# Patient Record
Sex: MALE | Race: OTHER | NOT HISPANIC OR LATINO | Employment: FULL TIME | URBAN - METROPOLITAN AREA
[De-identification: names, ages, dates, MRNs, and addresses within clinical notes are randomized per-mention and may not be internally consistent; named-entity substitution may affect disease eponyms.]

---

## 2017-06-07 ENCOUNTER — HOSPITAL ENCOUNTER (OUTPATIENT)
Dept: LAB | Facility: MEDICAL CENTER | Age: 52
End: 2017-06-07
Attending: EMERGENCY MEDICINE
Payer: COMMERCIAL

## 2017-06-07 LAB
25(OH)D3 SERPL-MCNC: 37 NG/ML (ref 30–100)
ALBUMIN SERPL BCP-MCNC: 4.4 G/DL (ref 3.2–4.9)
ALBUMIN/GLOB SERPL: 1.6 G/DL
ALP SERPL-CCNC: 45 U/L (ref 30–99)
ALT SERPL-CCNC: 29 U/L (ref 2–50)
ANION GAP SERPL CALC-SCNC: 7 MMOL/L (ref 0–11.9)
AST SERPL-CCNC: 20 U/L (ref 12–45)
BASOPHILS # BLD AUTO: 0.9 % (ref 0–1.8)
BASOPHILS # BLD: 0.05 K/UL (ref 0–0.12)
BILIRUB SERPL-MCNC: 1.2 MG/DL (ref 0.1–1.5)
BUN SERPL-MCNC: 13 MG/DL (ref 8–22)
CALCIUM SERPL-MCNC: 9.4 MG/DL (ref 8.5–10.5)
CHLORIDE SERPL-SCNC: 105 MMOL/L (ref 96–112)
CHOLEST SERPL-MCNC: 242 MG/DL (ref 100–199)
CO2 SERPL-SCNC: 27 MMOL/L (ref 20–33)
CREAT SERPL-MCNC: 0.85 MG/DL (ref 0.5–1.4)
EOSINOPHIL # BLD AUTO: 0.18 K/UL (ref 0–0.51)
EOSINOPHIL NFR BLD: 3.1 % (ref 0–6.9)
ERYTHROCYTE [DISTWIDTH] IN BLOOD BY AUTOMATED COUNT: 43.8 FL (ref 35.9–50)
ESTRADIOL SERPL-MCNC: <20 PG/ML
GFR SERPL CREATININE-BSD FRML MDRD: >60 ML/MIN/1.73 M 2
GGT SERPL-CCNC: 31 U/L (ref 7–51)
GLOBULIN SER CALC-MCNC: 2.7 G/DL (ref 1.9–3.5)
GLUCOSE SERPL-MCNC: 103 MG/DL (ref 65–99)
HCT VFR BLD AUTO: 51.1 % (ref 42–52)
HDLC SERPL-MCNC: 55 MG/DL
HGB BLD-MCNC: 17.6 G/DL (ref 14–18)
IMM GRANULOCYTES # BLD AUTO: 0.01 K/UL (ref 0–0.11)
IMM GRANULOCYTES NFR BLD AUTO: 0.2 % (ref 0–0.9)
LDH SERPL-CCNC: 162 U/L (ref 107–266)
LDLC SERPL CALC-MCNC: 154 MG/DL
LYMPHOCYTES # BLD AUTO: 2.39 K/UL (ref 1–4.8)
LYMPHOCYTES NFR BLD: 41.1 % (ref 22–41)
MCH RBC QN AUTO: 31.3 PG (ref 27–33)
MCHC RBC AUTO-ENTMCNC: 34.4 G/DL (ref 33.7–35.3)
MCV RBC AUTO: 90.8 FL (ref 81.4–97.8)
MONOCYTES # BLD AUTO: 0.5 K/UL (ref 0–0.85)
MONOCYTES NFR BLD AUTO: 8.6 % (ref 0–13.4)
NEUTROPHILS # BLD AUTO: 2.69 K/UL (ref 1.82–7.42)
NEUTROPHILS NFR BLD: 46.1 % (ref 44–72)
NRBC # BLD AUTO: 0 K/UL
NRBC BLD AUTO-RTO: 0 /100 WBC
PHOSPHATE SERPL-MCNC: 3.2 MG/DL (ref 2.5–4.5)
PLATELET # BLD AUTO: 215 K/UL (ref 164–446)
PMV BLD AUTO: 11 FL (ref 9–12.9)
POTASSIUM SERPL-SCNC: 4 MMOL/L (ref 3.6–5.5)
PROT SERPL-MCNC: 7.1 G/DL (ref 6–8.2)
PSA SERPL-MCNC: 0.98 NG/ML (ref 0–4)
RBC # BLD AUTO: 5.63 M/UL (ref 4.7–6.1)
SODIUM SERPL-SCNC: 139 MMOL/L (ref 135–145)
T3FREE SERPL-MCNC: 3.52 PG/ML (ref 2.4–4.2)
T4 FREE SERPL-MCNC: 0.76 NG/DL (ref 0.53–1.43)
TRIGL SERPL-MCNC: 163 MG/DL (ref 0–149)
TSH SERPL DL<=0.005 MIU/L-ACNC: 1.63 UIU/ML (ref 0.3–3.7)
URATE SERPL-MCNC: 8 MG/DL (ref 2.5–8.3)
WBC # BLD AUTO: 5.8 K/UL (ref 4.8–10.8)

## 2017-06-07 PROCEDURE — 80053 COMPREHEN METABOLIC PANEL: CPT

## 2017-06-07 PROCEDURE — 84481 FREE ASSAY (FT-3): CPT

## 2017-06-07 PROCEDURE — 82977 ASSAY OF GGT: CPT

## 2017-06-07 PROCEDURE — 84270 ASSAY OF SEX HORMONE GLOBUL: CPT

## 2017-06-07 PROCEDURE — 84305 ASSAY OF SOMATOMEDIN: CPT

## 2017-06-07 PROCEDURE — 84439 ASSAY OF FREE THYROXINE: CPT

## 2017-06-07 PROCEDURE — 82670 ASSAY OF TOTAL ESTRADIOL: CPT

## 2017-06-07 PROCEDURE — 84403 ASSAY OF TOTAL TESTOSTERONE: CPT

## 2017-06-07 PROCEDURE — 36415 COLL VENOUS BLD VENIPUNCTURE: CPT

## 2017-06-07 PROCEDURE — 84402 ASSAY OF FREE TESTOSTERONE: CPT

## 2017-06-07 PROCEDURE — 84550 ASSAY OF BLOOD/URIC ACID: CPT

## 2017-06-07 PROCEDURE — 80061 LIPID PANEL: CPT

## 2017-06-07 PROCEDURE — 85025 COMPLETE CBC W/AUTO DIFF WBC: CPT

## 2017-06-07 PROCEDURE — 84100 ASSAY OF PHOSPHORUS: CPT

## 2017-06-07 PROCEDURE — 82306 VITAMIN D 25 HYDROXY: CPT

## 2017-06-07 PROCEDURE — 84153 ASSAY OF PSA TOTAL: CPT

## 2017-06-07 PROCEDURE — 83615 LACTATE (LD) (LDH) ENZYME: CPT

## 2017-06-07 PROCEDURE — 84443 ASSAY THYROID STIM HORMONE: CPT

## 2017-06-09 LAB
IGF-I SERPL-MCNC: 151 NG/ML (ref 68–245)
SHBG SERPL-SCNC: 27 NMOL/L (ref 11–80)
TESTOST FREE MFR SERPL: 2 % (ref 1.6–2.9)
TESTOST FREE SERPL-MCNC: 52 PG/ML (ref 47–244)
TESTOST SERPL-MCNC: 262 NG/DL (ref 300–890)

## 2018-03-16 ENCOUNTER — HOSPITAL ENCOUNTER (OUTPATIENT)
Dept: LAB | Facility: MEDICAL CENTER | Age: 53
End: 2018-03-16
Attending: EMERGENCY MEDICINE
Payer: COMMERCIAL

## 2018-03-16 LAB
ALBUMIN SERPL BCP-MCNC: 4.8 G/DL (ref 3.2–4.9)
ALBUMIN/GLOB SERPL: 2.1 G/DL
ALP SERPL-CCNC: 46 U/L (ref 30–99)
ALT SERPL-CCNC: 30 U/L (ref 2–50)
ANION GAP SERPL CALC-SCNC: 10 MMOL/L (ref 0–11.9)
AST SERPL-CCNC: 20 U/L (ref 12–45)
BASOPHILS # BLD AUTO: 0.7 % (ref 0–1.8)
BASOPHILS # BLD: 0.04 K/UL (ref 0–0.12)
BILIRUB SERPL-MCNC: 0.9 MG/DL (ref 0.1–1.5)
BUN SERPL-MCNC: 12 MG/DL (ref 8–22)
CALCIUM SERPL-MCNC: 9.3 MG/DL (ref 8.5–10.5)
CHLORIDE SERPL-SCNC: 106 MMOL/L (ref 96–112)
CHOLEST SERPL-MCNC: 245 MG/DL (ref 100–199)
CO2 SERPL-SCNC: 24 MMOL/L (ref 20–33)
CREAT SERPL-MCNC: 0.93 MG/DL (ref 0.5–1.4)
EOSINOPHIL # BLD AUTO: 0.12 K/UL (ref 0–0.51)
EOSINOPHIL NFR BLD: 2.1 % (ref 0–6.9)
ERYTHROCYTE [DISTWIDTH] IN BLOOD BY AUTOMATED COUNT: 41.9 FL (ref 35.9–50)
ESTRADIOL SERPL-MCNC: 31 PG/ML
GGT SERPL-CCNC: 22 U/L (ref 7–51)
GLOBULIN SER CALC-MCNC: 2.3 G/DL (ref 1.9–3.5)
GLUCOSE SERPL-MCNC: 102 MG/DL (ref 65–99)
HCT VFR BLD AUTO: 52.1 % (ref 42–52)
HDLC SERPL-MCNC: 53 MG/DL
HGB BLD-MCNC: 18.1 G/DL (ref 14–18)
IMM GRANULOCYTES # BLD AUTO: 0.02 K/UL (ref 0–0.11)
IMM GRANULOCYTES NFR BLD AUTO: 0.3 % (ref 0–0.9)
LDH SERPL L TO P-CCNC: 159 U/L (ref 107–266)
LDLC SERPL CALC-MCNC: 159 MG/DL
LYMPHOCYTES # BLD AUTO: 2.08 K/UL (ref 1–4.8)
LYMPHOCYTES NFR BLD: 35.6 % (ref 22–41)
MCH RBC QN AUTO: 31.5 PG (ref 27–33)
MCHC RBC AUTO-ENTMCNC: 34.7 G/DL (ref 33.7–35.3)
MCV RBC AUTO: 90.8 FL (ref 81.4–97.8)
MONOCYTES # BLD AUTO: 0.42 K/UL (ref 0–0.85)
MONOCYTES NFR BLD AUTO: 7.2 % (ref 0–13.4)
NEUTROPHILS # BLD AUTO: 3.16 K/UL (ref 1.82–7.42)
NEUTROPHILS NFR BLD: 54.1 % (ref 44–72)
NRBC # BLD AUTO: 0 K/UL
NRBC BLD-RTO: 0 /100 WBC
PHOSPHATE SERPL-MCNC: 2.7 MG/DL (ref 2.5–4.5)
PLATELET # BLD AUTO: 246 K/UL (ref 164–446)
PMV BLD AUTO: 10.8 FL (ref 9–12.9)
POTASSIUM SERPL-SCNC: 4 MMOL/L (ref 3.6–5.5)
PROT SERPL-MCNC: 7.1 G/DL (ref 6–8.2)
PSA SERPL-MCNC: 0.72 NG/ML (ref 0–4)
RBC # BLD AUTO: 5.74 M/UL (ref 4.7–6.1)
SODIUM SERPL-SCNC: 140 MMOL/L (ref 135–145)
TRIGL SERPL-MCNC: 163 MG/DL (ref 0–149)
URATE SERPL-MCNC: 5.6 MG/DL (ref 2.5–8.3)
WBC # BLD AUTO: 5.8 K/UL (ref 4.8–10.8)

## 2018-03-16 PROCEDURE — 84402 ASSAY OF FREE TESTOSTERONE: CPT

## 2018-03-16 PROCEDURE — 82977 ASSAY OF GGT: CPT

## 2018-03-16 PROCEDURE — 82670 ASSAY OF TOTAL ESTRADIOL: CPT

## 2018-03-16 PROCEDURE — 84305 ASSAY OF SOMATOMEDIN: CPT

## 2018-03-16 PROCEDURE — 85025 COMPLETE CBC W/AUTO DIFF WBC: CPT

## 2018-03-16 PROCEDURE — 80053 COMPREHEN METABOLIC PANEL: CPT

## 2018-03-16 PROCEDURE — 84550 ASSAY OF BLOOD/URIC ACID: CPT

## 2018-03-16 PROCEDURE — 84403 ASSAY OF TOTAL TESTOSTERONE: CPT

## 2018-03-16 PROCEDURE — 84270 ASSAY OF SEX HORMONE GLOBUL: CPT

## 2018-03-16 PROCEDURE — 84153 ASSAY OF PSA TOTAL: CPT

## 2018-03-16 PROCEDURE — 84100 ASSAY OF PHOSPHORUS: CPT

## 2018-03-16 PROCEDURE — 36415 COLL VENOUS BLD VENIPUNCTURE: CPT

## 2018-03-16 PROCEDURE — 80061 LIPID PANEL: CPT

## 2018-03-16 PROCEDURE — 83615 LACTATE (LD) (LDH) ENZYME: CPT

## 2018-03-18 LAB
IGF-I SERPL-MCNC: 173 NG/ML (ref 64–218)
IGF-I Z-SCORE SERPL: 1
SHBG SERPL-SCNC: 28 NMOL/L (ref 11–80)
TESTOST FREE MFR SERPL: 2 % (ref 1.6–2.9)
TESTOST FREE SERPL-MCNC: 76 PG/ML (ref 47–244)
TESTOST SERPL-MCNC: 381 NG/DL (ref 300–890)

## 2019-01-10 ENCOUNTER — HOSPITAL ENCOUNTER (OUTPATIENT)
Dept: LAB | Facility: MEDICAL CENTER | Age: 54
End: 2019-01-10
Attending: EMERGENCY MEDICINE
Payer: COMMERCIAL

## 2019-01-10 LAB
ALBUMIN SERPL BCP-MCNC: 4.3 G/DL (ref 3.2–4.9)
ALBUMIN/GLOB SERPL: 1.7 G/DL
ALP SERPL-CCNC: 39 U/L (ref 30–99)
ALT SERPL-CCNC: 24 U/L (ref 2–50)
ANION GAP SERPL CALC-SCNC: 8 MMOL/L (ref 0–11.9)
AST SERPL-CCNC: 18 U/L (ref 12–45)
BASOPHILS # BLD AUTO: 0.9 % (ref 0–1.8)
BASOPHILS # BLD: 0.06 K/UL (ref 0–0.12)
BILIRUB SERPL-MCNC: 0.8 MG/DL (ref 0.1–1.5)
BUN SERPL-MCNC: 11 MG/DL (ref 8–22)
CALCIUM SERPL-MCNC: 9.3 MG/DL (ref 8.5–10.5)
CHLORIDE SERPL-SCNC: 108 MMOL/L (ref 96–112)
CHOLEST SERPL-MCNC: 215 MG/DL (ref 100–199)
CO2 SERPL-SCNC: 28 MMOL/L (ref 20–33)
CREAT SERPL-MCNC: 0.89 MG/DL (ref 0.5–1.4)
EOSINOPHIL # BLD AUTO: 0.13 K/UL (ref 0–0.51)
EOSINOPHIL NFR BLD: 2 % (ref 0–6.9)
ERYTHROCYTE [DISTWIDTH] IN BLOOD BY AUTOMATED COUNT: 42.1 FL (ref 35.9–50)
ESTRADIOL SERPL-MCNC: <20 PG/ML
GGT SERPL-CCNC: 27 U/L (ref 7–51)
GLOBULIN SER CALC-MCNC: 2.5 G/DL (ref 1.9–3.5)
GLUCOSE SERPL-MCNC: 100 MG/DL (ref 65–99)
HCT VFR BLD AUTO: 46.2 % (ref 42–52)
HDLC SERPL-MCNC: 51 MG/DL
HGB BLD-MCNC: 16.2 G/DL (ref 14–18)
IMM GRANULOCYTES # BLD AUTO: 0.01 K/UL (ref 0–0.11)
IMM GRANULOCYTES NFR BLD AUTO: 0.2 % (ref 0–0.9)
LDH SERPL L TO P-CCNC: 149 U/L (ref 107–266)
LDLC SERPL CALC-MCNC: 136 MG/DL
LYMPHOCYTES # BLD AUTO: 2.98 K/UL (ref 1–4.8)
LYMPHOCYTES NFR BLD: 45.7 % (ref 22–41)
MCH RBC QN AUTO: 31.5 PG (ref 27–33)
MCHC RBC AUTO-ENTMCNC: 35.1 G/DL (ref 33.7–35.3)
MCV RBC AUTO: 89.7 FL (ref 81.4–97.8)
MONOCYTES # BLD AUTO: 0.51 K/UL (ref 0–0.85)
MONOCYTES NFR BLD AUTO: 7.8 % (ref 0–13.4)
NEUTROPHILS # BLD AUTO: 2.83 K/UL (ref 1.82–7.42)
NEUTROPHILS NFR BLD: 43.4 % (ref 44–72)
NRBC # BLD AUTO: 0 K/UL
NRBC BLD-RTO: 0 /100 WBC
PHOSPHATE SERPL-MCNC: 3.2 MG/DL (ref 2.5–4.5)
PLATELET # BLD AUTO: 224 K/UL (ref 164–446)
PMV BLD AUTO: 11.4 FL (ref 9–12.9)
POTASSIUM SERPL-SCNC: 4.5 MMOL/L (ref 3.6–5.5)
PROT SERPL-MCNC: 6.8 G/DL (ref 6–8.2)
PSA SERPL-MCNC: 0.95 NG/ML (ref 0–4)
RBC # BLD AUTO: 5.15 M/UL (ref 4.7–6.1)
SODIUM SERPL-SCNC: 144 MMOL/L (ref 135–145)
TRIGL SERPL-MCNC: 141 MG/DL (ref 0–149)
URATE SERPL-MCNC: 6.5 MG/DL (ref 2.5–8.3)
WBC # BLD AUTO: 6.5 K/UL (ref 4.8–10.8)

## 2019-01-10 PROCEDURE — 84403 ASSAY OF TOTAL TESTOSTERONE: CPT

## 2019-01-10 PROCEDURE — 84153 ASSAY OF PSA TOTAL: CPT

## 2019-01-10 PROCEDURE — 84305 ASSAY OF SOMATOMEDIN: CPT

## 2019-01-10 PROCEDURE — 82670 ASSAY OF TOTAL ESTRADIOL: CPT

## 2019-01-10 PROCEDURE — 83615 LACTATE (LD) (LDH) ENZYME: CPT

## 2019-01-10 PROCEDURE — 82977 ASSAY OF GGT: CPT

## 2019-01-10 PROCEDURE — 84270 ASSAY OF SEX HORMONE GLOBUL: CPT

## 2019-01-10 PROCEDURE — 80053 COMPREHEN METABOLIC PANEL: CPT

## 2019-01-10 PROCEDURE — 80061 LIPID PANEL: CPT

## 2019-01-10 PROCEDURE — 84100 ASSAY OF PHOSPHORUS: CPT

## 2019-01-10 PROCEDURE — 84550 ASSAY OF BLOOD/URIC ACID: CPT

## 2019-01-10 PROCEDURE — 36415 COLL VENOUS BLD VENIPUNCTURE: CPT

## 2019-01-10 PROCEDURE — 85025 COMPLETE CBC W/AUTO DIFF WBC: CPT

## 2019-01-12 LAB
SHBG SERPL-SCNC: 28 NMOL/L (ref 11–80)
TESTOST FREE MFR SERPL: 2.1 % (ref 1.6–2.9)
TESTOST FREE SERPL-MCNC: 125 PG/ML (ref 47–244)
TESTOST SERPL-MCNC: 592 NG/DL (ref 300–890)

## 2019-01-13 LAB
IGF-I SERPL-MCNC: 181 NG/ML (ref 62–214)
IGF-I Z-SCORE SERPL: 1.2

## 2019-08-12 ENCOUNTER — HOSPITAL ENCOUNTER (OUTPATIENT)
Dept: LAB | Facility: MEDICAL CENTER | Age: 54
End: 2019-08-12
Attending: EMERGENCY MEDICINE
Payer: COMMERCIAL

## 2019-08-12 LAB
ALBUMIN SERPL BCP-MCNC: 4.5 G/DL (ref 3.2–4.9)
ALBUMIN/GLOB SERPL: 1.6 G/DL
ALP SERPL-CCNC: 47 U/L (ref 30–99)
ALT SERPL-CCNC: 30 U/L (ref 2–50)
ANION GAP SERPL CALC-SCNC: 11 MMOL/L (ref 0–11.9)
AST SERPL-CCNC: 22 U/L (ref 12–45)
BASOPHILS # BLD AUTO: 0.8 % (ref 0–1.8)
BASOPHILS # BLD: 0.04 K/UL (ref 0–0.12)
BILIRUB SERPL-MCNC: 0.9 MG/DL (ref 0.1–1.5)
BUN SERPL-MCNC: 14 MG/DL (ref 8–22)
CALCIUM SERPL-MCNC: 9 MG/DL (ref 8.5–10.5)
CHLORIDE SERPL-SCNC: 104 MMOL/L (ref 96–112)
CHOLEST SERPL-MCNC: 254 MG/DL (ref 100–199)
CO2 SERPL-SCNC: 23 MMOL/L (ref 20–33)
CREAT SERPL-MCNC: 0.98 MG/DL (ref 0.5–1.4)
EOSINOPHIL # BLD AUTO: 0.1 K/UL (ref 0–0.51)
EOSINOPHIL NFR BLD: 2.1 % (ref 0–6.9)
ERYTHROCYTE [DISTWIDTH] IN BLOOD BY AUTOMATED COUNT: 40.9 FL (ref 35.9–50)
ESTRADIOL SERPL-MCNC: <20 PG/ML
GGT SERPL-CCNC: 25 U/L (ref 7–51)
GLOBULIN SER CALC-MCNC: 2.9 G/DL (ref 1.9–3.5)
GLUCOSE SERPL-MCNC: 91 MG/DL (ref 65–99)
HCT VFR BLD AUTO: 46 % (ref 42–52)
HDLC SERPL-MCNC: 61 MG/DL
HGB BLD-MCNC: 15.3 G/DL (ref 14–18)
IMM GRANULOCYTES # BLD AUTO: 0 K/UL (ref 0–0.11)
IMM GRANULOCYTES NFR BLD AUTO: 0 % (ref 0–0.9)
LDH SERPL L TO P-CCNC: 178 U/L (ref 107–266)
LDLC SERPL CALC-MCNC: 165 MG/DL
LYMPHOCYTES # BLD AUTO: 2.08 K/UL (ref 1–4.8)
LYMPHOCYTES NFR BLD: 43.1 % (ref 22–41)
MCH RBC QN AUTO: 27.5 PG (ref 27–33)
MCHC RBC AUTO-ENTMCNC: 33.3 G/DL (ref 33.7–35.3)
MCV RBC AUTO: 82.7 FL (ref 81.4–97.8)
MONOCYTES # BLD AUTO: 0.46 K/UL (ref 0–0.85)
MONOCYTES NFR BLD AUTO: 9.5 % (ref 0–13.4)
NEUTROPHILS # BLD AUTO: 2.15 K/UL (ref 1.82–7.42)
NEUTROPHILS NFR BLD: 44.5 % (ref 44–72)
NRBC # BLD AUTO: 0 K/UL
NRBC BLD-RTO: 0 /100 WBC
PHOSPHATE SERPL-MCNC: 2.9 MG/DL (ref 2.5–4.5)
PLATELET # BLD AUTO: 232 K/UL (ref 164–446)
PMV BLD AUTO: 11.2 FL (ref 9–12.9)
POTASSIUM SERPL-SCNC: 3.7 MMOL/L (ref 3.6–5.5)
PROT SERPL-MCNC: 7.4 G/DL (ref 6–8.2)
PSA SERPL-MCNC: 0.59 NG/ML (ref 0–4)
RBC # BLD AUTO: 5.56 M/UL (ref 4.7–6.1)
SODIUM SERPL-SCNC: 138 MMOL/L (ref 135–145)
T3FREE SERPL-MCNC: 3.21 PG/ML (ref 2.4–4.2)
T4 SERPL-MCNC: 5.4 UG/DL (ref 4–12)
TRIGL SERPL-MCNC: 142 MG/DL (ref 0–149)
TSH SERPL DL<=0.005 MIU/L-ACNC: 4.18 UIU/ML (ref 0.38–5.33)
URATE SERPL-MCNC: 7.6 MG/DL (ref 2.5–8.3)
WBC # BLD AUTO: 4.8 K/UL (ref 4.8–10.8)

## 2019-08-12 PROCEDURE — 36415 COLL VENOUS BLD VENIPUNCTURE: CPT

## 2019-08-12 PROCEDURE — 80061 LIPID PANEL: CPT

## 2019-08-12 PROCEDURE — 84305 ASSAY OF SOMATOMEDIN: CPT

## 2019-08-12 PROCEDURE — 84443 ASSAY THYROID STIM HORMONE: CPT

## 2019-08-12 PROCEDURE — 80053 COMPREHEN METABOLIC PANEL: CPT

## 2019-08-12 PROCEDURE — 84481 FREE ASSAY (FT-3): CPT

## 2019-08-12 PROCEDURE — 84100 ASSAY OF PHOSPHORUS: CPT

## 2019-08-12 PROCEDURE — 83615 LACTATE (LD) (LDH) ENZYME: CPT

## 2019-08-12 PROCEDURE — 84436 ASSAY OF TOTAL THYROXINE: CPT

## 2019-08-12 PROCEDURE — 84153 ASSAY OF PSA TOTAL: CPT

## 2019-08-12 PROCEDURE — 85025 COMPLETE CBC W/AUTO DIFF WBC: CPT

## 2019-08-12 PROCEDURE — 82977 ASSAY OF GGT: CPT

## 2019-08-12 PROCEDURE — 82670 ASSAY OF TOTAL ESTRADIOL: CPT

## 2019-08-12 PROCEDURE — 84550 ASSAY OF BLOOD/URIC ACID: CPT

## 2019-08-12 PROCEDURE — 84270 ASSAY OF SEX HORMONE GLOBUL: CPT

## 2019-08-12 PROCEDURE — 84403 ASSAY OF TOTAL TESTOSTERONE: CPT

## 2019-08-14 LAB
IGF-I SERPL-MCNC: 123 NG/ML (ref 62–214)
IGF-I Z-SCORE SERPL: 0
SHBG SERPL-SCNC: 29 NMOL/L (ref 11–80)
TESTOST FREE MFR SERPL: 1.8 % (ref 1.6–2.9)
TESTOST FREE SERPL-MCNC: 15 PG/ML (ref 47–244)
TESTOST SERPL-MCNC: 84 NG/DL (ref 300–890)

## 2021-08-12 ENCOUNTER — PRE-ADMISSION TESTING (OUTPATIENT)
Dept: ADMISSIONS | Facility: MEDICAL CENTER | Age: 56
End: 2021-08-12
Attending: OPHTHALMOLOGY
Payer: COMMERCIAL

## 2021-08-12 RX ORDER — AMPICILLIN TRIHYDRATE 250 MG
1 CAPSULE ORAL DAILY
COMMUNITY

## 2021-08-18 ENCOUNTER — HOSPITAL ENCOUNTER (OUTPATIENT)
Facility: MEDICAL CENTER | Age: 56
End: 2021-08-18
Attending: OPHTHALMOLOGY | Admitting: OPHTHALMOLOGY
Payer: COMMERCIAL

## 2021-08-18 ENCOUNTER — ANESTHESIA EVENT (OUTPATIENT)
Dept: SURGERY | Facility: MEDICAL CENTER | Age: 56
End: 2021-08-18
Payer: COMMERCIAL

## 2021-08-18 ENCOUNTER — ANESTHESIA (OUTPATIENT)
Dept: SURGERY | Facility: MEDICAL CENTER | Age: 56
End: 2021-08-18
Payer: COMMERCIAL

## 2021-08-18 VITALS
OXYGEN SATURATION: 96 % | HEART RATE: 90 BPM | WEIGHT: 187.39 LBS | BODY MASS INDEX: 29.41 KG/M2 | HEIGHT: 67 IN | RESPIRATION RATE: 18 BRPM | DIASTOLIC BLOOD PRESSURE: 84 MMHG | TEMPERATURE: 97 F | SYSTOLIC BLOOD PRESSURE: 146 MMHG

## 2021-08-18 PROCEDURE — 700101 HCHG RX REV CODE 250: Performed by: OPHTHALMOLOGY

## 2021-08-18 PROCEDURE — 160027 HCHG SURGERY MINUTES - 1ST 30 MINS LEVEL 2: Performed by: OPHTHALMOLOGY

## 2021-08-18 PROCEDURE — 160048 HCHG OR STATISTICAL LEVEL 1-5: Performed by: OPHTHALMOLOGY

## 2021-08-18 PROCEDURE — 160038 HCHG SURGERY MINUTES - EA ADDL 1 MIN LEVEL 2: Performed by: OPHTHALMOLOGY

## 2021-08-18 PROCEDURE — 500558 HCHG EYE SHIELD W/GARTER (FOX): Performed by: OPHTHALMOLOGY

## 2021-08-18 PROCEDURE — 700102 HCHG RX REV CODE 250 W/ 637 OVERRIDE(OP): Performed by: ANESTHESIOLOGY

## 2021-08-18 PROCEDURE — 501838 HCHG SUTURE GENERAL: Performed by: OPHTHALMOLOGY

## 2021-08-18 PROCEDURE — 160002 HCHG RECOVERY MINUTES (STAT): Performed by: OPHTHALMOLOGY

## 2021-08-18 PROCEDURE — 160046 HCHG PACU - 1ST 60 MINS PHASE II: Performed by: OPHTHALMOLOGY

## 2021-08-18 PROCEDURE — 160009 HCHG ANES TIME/MIN: Performed by: OPHTHALMOLOGY

## 2021-08-18 PROCEDURE — 700111 HCHG RX REV CODE 636 W/ 250 OVERRIDE (IP): Performed by: ANESTHESIOLOGY

## 2021-08-18 PROCEDURE — 160025 RECOVERY II MINUTES (STATS): Performed by: OPHTHALMOLOGY

## 2021-08-18 PROCEDURE — 700101 HCHG RX REV CODE 250: Performed by: ANESTHESIOLOGY

## 2021-08-18 PROCEDURE — 160035 HCHG PACU - 1ST 60 MINS PHASE I: Performed by: OPHTHALMOLOGY

## 2021-08-18 PROCEDURE — A9270 NON-COVERED ITEM OR SERVICE: HCPCS | Performed by: ANESTHESIOLOGY

## 2021-08-18 RX ORDER — HYDRALAZINE HYDROCHLORIDE 20 MG/ML
5 INJECTION INTRAMUSCULAR; INTRAVENOUS
Status: DISCONTINUED | OUTPATIENT
Start: 2021-08-18 | End: 2021-08-18 | Stop reason: HOSPADM

## 2021-08-18 RX ORDER — HALOPERIDOL 5 MG/ML
1 INJECTION INTRAMUSCULAR
Status: DISCONTINUED | OUTPATIENT
Start: 2021-08-18 | End: 2021-08-18 | Stop reason: HOSPADM

## 2021-08-18 RX ORDER — ONDANSETRON 2 MG/ML
INJECTION INTRAMUSCULAR; INTRAVENOUS PRN
Status: DISCONTINUED | OUTPATIENT
Start: 2021-08-18 | End: 2021-08-18 | Stop reason: SURG

## 2021-08-18 RX ORDER — LABETALOL HYDROCHLORIDE 5 MG/ML
5 INJECTION, SOLUTION INTRAVENOUS
Status: DISCONTINUED | OUTPATIENT
Start: 2021-08-18 | End: 2021-08-18 | Stop reason: HOSPADM

## 2021-08-18 RX ORDER — MEPERIDINE HYDROCHLORIDE 25 MG/ML
12.5 INJECTION INTRAMUSCULAR; INTRAVENOUS; SUBCUTANEOUS
Status: DISCONTINUED | OUTPATIENT
Start: 2021-08-18 | End: 2021-08-18 | Stop reason: HOSPADM

## 2021-08-18 RX ORDER — CEFAZOLIN SODIUM 1 G/3ML
INJECTION, POWDER, FOR SOLUTION INTRAMUSCULAR; INTRAVENOUS PRN
Status: DISCONTINUED | OUTPATIENT
Start: 2021-08-18 | End: 2021-08-18 | Stop reason: SURG

## 2021-08-18 RX ORDER — DIPHENHYDRAMINE HYDROCHLORIDE 50 MG/ML
12.5 INJECTION INTRAMUSCULAR; INTRAVENOUS
Status: DISCONTINUED | OUTPATIENT
Start: 2021-08-18 | End: 2021-08-18 | Stop reason: HOSPADM

## 2021-08-18 RX ORDER — ROCURONIUM BROMIDE 10 MG/ML
INJECTION, SOLUTION INTRAVENOUS PRN
Status: DISCONTINUED | OUTPATIENT
Start: 2021-08-18 | End: 2021-08-18 | Stop reason: SURG

## 2021-08-18 RX ORDER — ACETAMINOPHEN 500 MG
1000 TABLET ORAL ONCE
Status: COMPLETED | OUTPATIENT
Start: 2021-08-18 | End: 2021-08-18

## 2021-08-18 RX ORDER — ERYTHROMYCIN 5 MG/G
OINTMENT OPHTHALMIC
Status: DISCONTINUED
Start: 2021-08-18 | End: 2021-08-18 | Stop reason: HOSPADM

## 2021-08-18 RX ORDER — SODIUM CHLORIDE, SODIUM LACTATE, POTASSIUM CHLORIDE, CALCIUM CHLORIDE 600; 310; 30; 20 MG/100ML; MG/100ML; MG/100ML; MG/100ML
INJECTION, SOLUTION INTRAVENOUS CONTINUOUS
Status: DISCONTINUED | OUTPATIENT
Start: 2021-08-18 | End: 2021-08-18 | Stop reason: HOSPADM

## 2021-08-18 RX ORDER — ERYTHROMYCIN 5 MG/G
OINTMENT OPHTHALMIC
Status: DISCONTINUED | OUTPATIENT
Start: 2021-08-18 | End: 2021-08-18 | Stop reason: HOSPADM

## 2021-08-18 RX ORDER — DEXAMETHASONE SODIUM PHOSPHATE 4 MG/ML
INJECTION, SOLUTION INTRA-ARTICULAR; INTRALESIONAL; INTRAMUSCULAR; INTRAVENOUS; SOFT TISSUE PRN
Status: DISCONTINUED | OUTPATIENT
Start: 2021-08-18 | End: 2021-08-18 | Stop reason: SURG

## 2021-08-18 RX ORDER — HYDROMORPHONE HYDROCHLORIDE 1 MG/ML
0.4 INJECTION, SOLUTION INTRAMUSCULAR; INTRAVENOUS; SUBCUTANEOUS
Status: DISCONTINUED | OUTPATIENT
Start: 2021-08-18 | End: 2021-08-18 | Stop reason: HOSPADM

## 2021-08-18 RX ORDER — OXYCODONE HCL 5 MG/5 ML
5 SOLUTION, ORAL ORAL
Status: DISCONTINUED | OUTPATIENT
Start: 2021-08-18 | End: 2021-08-18 | Stop reason: HOSPADM

## 2021-08-18 RX ORDER — MIDAZOLAM HYDROCHLORIDE 1 MG/ML
1 INJECTION INTRAMUSCULAR; INTRAVENOUS
Status: DISCONTINUED | OUTPATIENT
Start: 2021-08-18 | End: 2021-08-18 | Stop reason: HOSPADM

## 2021-08-18 RX ORDER — OXYCODONE HCL 5 MG/5 ML
10 SOLUTION, ORAL ORAL
Status: DISCONTINUED | OUTPATIENT
Start: 2021-08-18 | End: 2021-08-18 | Stop reason: HOSPADM

## 2021-08-18 RX ORDER — LIDOCAINE HYDROCHLORIDE 20 MG/ML
INJECTION, SOLUTION EPIDURAL; INFILTRATION; INTRACAUDAL; PERINEURAL PRN
Status: DISCONTINUED | OUTPATIENT
Start: 2021-08-18 | End: 2021-08-18 | Stop reason: SURG

## 2021-08-18 RX ORDER — HYDROMORPHONE HYDROCHLORIDE 1 MG/ML
0.2 INJECTION, SOLUTION INTRAMUSCULAR; INTRAVENOUS; SUBCUTANEOUS
Status: DISCONTINUED | OUTPATIENT
Start: 2021-08-18 | End: 2021-08-18 | Stop reason: HOSPADM

## 2021-08-18 RX ORDER — ONDANSETRON 2 MG/ML
4 INJECTION INTRAMUSCULAR; INTRAVENOUS
Status: DISCONTINUED | OUTPATIENT
Start: 2021-08-18 | End: 2021-08-18 | Stop reason: HOSPADM

## 2021-08-18 RX ORDER — LIDOCAINE HYDROCHLORIDE 20 MG/ML
INJECTION, SOLUTION INFILTRATION; PERINEURAL
Status: COMPLETED
Start: 2021-08-18 | End: 2021-08-18

## 2021-08-18 RX ORDER — LIDOCAINE HYDROCHLORIDE AND EPINEPHRINE BITARTRATE 20; .01 MG/ML; MG/ML
INJECTION, SOLUTION SUBCUTANEOUS
Status: DISCONTINUED | OUTPATIENT
Start: 2021-08-18 | End: 2021-08-18 | Stop reason: HOSPADM

## 2021-08-18 RX ORDER — MIDAZOLAM HYDROCHLORIDE 1 MG/ML
INJECTION INTRAMUSCULAR; INTRAVENOUS PRN
Status: DISCONTINUED | OUTPATIENT
Start: 2021-08-18 | End: 2021-08-18 | Stop reason: SURG

## 2021-08-18 RX ORDER — HYDROMORPHONE HYDROCHLORIDE 1 MG/ML
0.1 INJECTION, SOLUTION INTRAMUSCULAR; INTRAVENOUS; SUBCUTANEOUS
Status: DISCONTINUED | OUTPATIENT
Start: 2021-08-18 | End: 2021-08-18 | Stop reason: HOSPADM

## 2021-08-18 RX ORDER — METOPROLOL TARTRATE 1 MG/ML
1 INJECTION, SOLUTION INTRAVENOUS
Status: DISCONTINUED | OUTPATIENT
Start: 2021-08-18 | End: 2021-08-18 | Stop reason: HOSPADM

## 2021-08-18 RX ADMIN — ONDANSETRON 4 MG: 2 INJECTION INTRAMUSCULAR; INTRAVENOUS at 15:56

## 2021-08-18 RX ADMIN — LIDOCAINE HYDROCHLORIDE 100 MG: 20 INJECTION, SOLUTION EPIDURAL; INFILTRATION; INTRACAUDAL at 15:50

## 2021-08-18 RX ADMIN — FENTANYL CITRATE 50 MCG: 50 INJECTION, SOLUTION INTRAMUSCULAR; INTRAVENOUS at 16:52

## 2021-08-18 RX ADMIN — DEXAMETHASONE SODIUM PHOSPHATE 4 MG: 4 INJECTION, SOLUTION INTRA-ARTICULAR; INTRALESIONAL; INTRAMUSCULAR; INTRAVENOUS; SOFT TISSUE at 15:56

## 2021-08-18 RX ADMIN — PROPOFOL 200 MG: 10 INJECTION, EMULSION INTRAVENOUS at 15:50

## 2021-08-18 RX ADMIN — FENTANYL CITRATE 100 MCG: 50 INJECTION, SOLUTION INTRAMUSCULAR; INTRAVENOUS at 16:08

## 2021-08-18 RX ADMIN — ROCURONIUM BROMIDE 25 MG: 10 INJECTION, SOLUTION INTRAVENOUS at 15:50

## 2021-08-18 RX ADMIN — ACETAMINOPHEN 1000 MG: 500 TABLET ORAL at 14:24

## 2021-08-18 RX ADMIN — FENTANYL CITRATE 100 MCG: 50 INJECTION, SOLUTION INTRAMUSCULAR; INTRAVENOUS at 15:50

## 2021-08-18 RX ADMIN — CEFAZOLIN 2 G: 330 INJECTION, POWDER, FOR SOLUTION INTRAMUSCULAR; INTRAVENOUS at 15:50

## 2021-08-18 RX ADMIN — MIDAZOLAM HYDROCHLORIDE 2 MG: 1 INJECTION, SOLUTION INTRAMUSCULAR; INTRAVENOUS at 15:47

## 2021-08-18 ASSESSMENT — PAIN DESCRIPTION - PAIN TYPE
TYPE: SURGICAL PAIN

## 2021-08-18 ASSESSMENT — PAIN SCALES - GENERAL: PAIN_LEVEL: 0

## 2021-08-18 NOTE — OR NURSING
1700- Pt to PACU bay 3 from OR s/p bilateral upper lid blepharoplasty. Report from anesthesia and OR RN. Respirations even and unlabored. VSS.     1705- Oral airway d/c'd    1720- Pt awake. Declines pain. Tolerating sips of water.     1740- Pt's friend, Zahra, called and updated.    1745- Phase II criteria met    1755- Discharge instructions discussed with pt's friend, Zahra. All questions answered. Verbalized understanding.    1805- Pt expresses readiness for discharge. Helped to edge of bed. Dressing independently.    1815- PIV removed with tip intact. Pt escorted out of department ambulatory with all belongings. Discharged home to responsible adult.

## 2021-08-18 NOTE — ANESTHESIA PROCEDURE NOTES
Airway    Date/Time: 8/18/2021 3:52 PM  Performed by: Skinny Ledesma D.O.  Authorized by: Skinny Ledesma D.O.     Location:  OR  Urgency:  Elective  Indications for Airway Management:  Anesthesia      Spontaneous Ventilation: absent    Sedation Level:  Deep  Preoxygenated: Yes    Final Airway Type:  Supraglottic airway  Final Supraglottic Airway:  Standard LMA    SGA Size:  5  Number of Attempts at Approach:  1

## 2021-08-19 NOTE — OP REPORT
DATE OF OPERATION: 8/18/2021    ATTENDING SURGEON: Gregory Whaley MD     ASSISTANT SURGEON: None.     ANESTHESIOLOGIST: Skinny Moseley MD    ANESTHESIA: General.     PREOPERATIVE DIAGNOSES:   1. Visual obstruction, bilateral.   2. Dermatochalasis, bilateral.     POSTOPERATIVE DIAGNOSES:   1. Visual obstruction, bilateral.   2. Dermatochalasis, bilateral.     PROCEDURES PERFORMED:   1. Blepharoplasty, functional, bilateral upper eyelids.     SPECIMENS: None.     IMPLANTS: None.     COMPLICATIONS: None.     ESTIMATED BLOOD LOSS: Less than 5 mL.     INDICATIONS FOR PROCEDURE: This is a 55 y.o. male with a history of   dermatochalasis of the upper eyelids bilaterally. They were causing visual   obstruction that was demonstrable on clinical examination and on visual field   testing. It was recommended to the patient to undergo the above listed   procedures. Risks, benefits, and alternatives were explained to the patient   in detail and informed consent was signed.     PROCEDURE IN DETAIL: Patient was brought to the operating room and laid   supine on operating room table. The full face was then prepped and draped in   usual sterile fashion. A lid crease incision was then designed using a   marking pen taking into account the patient's natural lid crease at 10 mm   bilaterally. The superior extent of the blepharoplasty excision area was then   marked out with a caliper from the inferior brow hairs measuring 8 mm. This   was then verified with smooth forceps and then the excision area was then   drawn in completely with a marking pen. The procedure was then begun on   the right side first.     The right upper eyelid was then infiltrated with 2% lidocaine with   epinephrine. A #15 blade was used to incise the skin down to the orbicularis fibers along the excision areas marked out previously. Bipolar cautery was used for   hemostasis. A monopolar cautery was then used to excise a skin flap within the ellipse marked. Additional  hemostasis was obtained with bipolar cautery. The medial fat pad was exposed with sharp dissection through the orbital septum with additional exposure using a rake retractor.  The medial fat pad was teased out with a combination of sharp and careful blunt dissection.  Bipolar cautery was used for hemostasis as needed.  The fat pad was infiltrated with local anesthetic and then excised using a clamp-cut-cautery method.  The blepharoplasty was then closed with several interrupted lid crease reforming 6-0 express gut sutures. The wound was then closed with a 6-0 express gut suture.     Attention was then turned to the contralateral left upper eyelid where the identical   procedure was performed. Patient tolerated the procedure well. Antibiotic   ointment was placed on the incision at the conclusion of the case. There were   no complications. The patient was taken to the PACU in stable condition.

## 2021-08-19 NOTE — ANESTHESIA POSTPROCEDURE EVALUATION
Patient: Skinny Alcantar    Procedure Summary     Date: 08/18/21 Room / Location: [facility] ROOM 24 / SURGERY SAME DAY [facility]    Anesthesia Start: 1547 Anesthesia Stop: 1702    Procedure: BLEPHAROPLASTY - UPPER LID. (Bilateral Eye) Diagnosis: (VISUAL FIELD OBSTRUCTION, DERMATOCHALASIS)    Surgeons: Gregory Whaley M.D. Responsible Provider: Skinny Ledesma D.O.    Anesthesia Type: general ASA Status: 2          Final Anesthesia Type: general  Last vitals  BP   Blood Pressure: 104/55    Temp   36.8 °C (98.3 °F)    Pulse   78   Resp   14    SpO2   92 %      Anesthesia Post Evaluation    Patient location during evaluation: PACU  Patient participation: waiting for patient participation  Level of consciousness: awake and alert  Pain score: 0    Airway patency: patent  Anesthetic complications: no  Cardiovascular status: hemodynamically stable  Respiratory status: acceptable  Hydration status: euvolemic    PONV: none          There were no known complications for this encounter.

## 2021-08-19 NOTE — OR SURGEON
Immediate Post OP Note    PreOp Diagnosis: Dermatochalasis BUL      PostOp Diagnosis: Same      Procedure(s):  BLEPHAROPLASTY - UPPER LID. - Wound Class: Clean    Surgeon(s):  Gregory Whaley M.D.    Anesthesiologist/Type of Anesthesia:  Anesthesiologist: Skinny Ledesma D.O./General    Surgical Staff:  Circulator: Jennifer Garcia R.N.  Scrub Person: Fidelina Espinosa; Freida Mary    Specimens removed if any:  * No specimens in log *    Estimated Blood Loss: <5cc    Findings: DC BUL    Complications: None        8/18/2021 5:01 PM Gregory Whaley M.D.

## 2021-08-19 NOTE — ANESTHESIA POSTPROCEDURE EVALUATION
Patient: Skinny Alcantar    Procedure Summary     Date: 08/18/21 Room / Location: [facility] ROOM 24 / SURGERY SAME DAY [facility]    Anesthesia Start: 1547 Anesthesia Stop: 1702    Procedure: BLEPHAROPLASTY - UPPER LID. (Bilateral Eye) Diagnosis: (VISUAL FIELD OBSTRUCTION, DERMATOCHALASIS)    Surgeons: Gregory Whaley M.D. Responsible Provider: Skinny Ledesma D.O.    Anesthesia Type: general ASA Status: 2          Final Anesthesia Type: general  Last vitals  BP   Blood Pressure: 104/55    Temp   36.8 °C (98.3 °F)    Pulse   78   Resp   14    SpO2   92 %      Anesthesia Post Evaluation    Patient location during evaluation: PACU  Patient participation: waiting for patient participation  Pain score: 0    Airway patency: patent  Anesthetic complications: no  Cardiovascular status: hemodynamically stable  Respiratory status: acceptable  Hydration status: euvolemic    PONV: none          There were no known complications for this encounter.

## 2021-08-19 NOTE — DISCHARGE INSTRUCTIONS
ACTIVITY: Rest and take it easy for the first 24 hours.  A responsible adult is recommended to remain with you during that time.  It is normal to feel sleepy.  We encourage you to not do anything that requires balance, judgment or coordination.    MILD FLU-LIKE SYMPTOMS ARE NORMAL. YOU MAY EXPERIENCE GENERALIZED MUSCLE ACHES, THROAT IRRITATION, HEADACHE AND/OR SOME NAUSEA.    FOR 24 HOURS DO NOT:  Drive, operate machinery or run household appliances.  Drink beer or alcoholic beverages.   Make important decisions or sign legal documents.    SPECIAL INSTRUCTIONS:   OCULOPLASTICS SURGERY POSTOP INSTRUCTIONS:  - No heavy lifting, bending, stooping, straining, or exercise for 2 weeks following surgery.   - Keep head elevated.  - Keep wound or dressing clean and dry.  Do not get wet.    - Apply cool compresses to operative eyelid(s) for 20 minutes on and 20 minutes off while awake for the first 48 hrs.  Then switch to warm compresses 4 times per day until postop appt.   - DO NOT rub or pull on the operative eyelid(s).  - Wear eye shield at night over the operative eye(s) while sleeping for 3 weeks after surgery.  - Do not take any aspirin or NSAID (e.g., Motrin, ibuprofen, Advil, naproxen, etc) for 1 week after surgery.   - Please take only over-the-counter Tylenol (acetominophen) as needed for pain.   - Please call Dr. Whaley's office with any questions or concerns.  [phone].     Postop Medications: Prescriptions have all been e-Rx'd to the patient's preferred pharmacy prior to surgery.   - Erythromycin ophthalmic ointment apply thin layer 3 times per day to incision lines.    - Over the counter Tylenol (acetaminophen) as needed for pain.     DIET: To avoid nausea, slowly advance diet as tolerated, avoiding spicy or greasy foods for the first day.  Add more substantial food to your diet according to your physician's instructions. INCREASE FLUIDS AND FIBER TO AVOID CONSTIPATION.    SURGICAL DRESSING/BATHING: Keep  incisions clean and dry. Do not get wet. Wear eye shields while sleeping.    FOLLOW-UP APPOINTMENT:  A follow-up appointment should be arranged with your doctor in 1-2 weeks; call to schedule.    You should CALL YOUR PHYSICIAN if you develop:  Fever greater than 101 degrees F.  Pain not relieved by medication, or persistent nausea or vomiting.  Excessive bleeding (blood soaking through dressing) or unexpected drainage from the wound.  Extreme redness or swelling around the incision site, drainage of pus or foul smelling drainage.  Inability to urinate or empty your bladder within 8 hours.  Problems with breathing or chest pain.    You should call 911 if you develop problems with breathing or chest pain.  If you are unable to contact your doctor or surgical center, you should go to the nearest emergency room or urgent care center.  Dr. Whaley's telephone #: [phone]    If any questions arise, call your doctor.  If your doctor is not available, please feel free to call the Surgical Center at [phone]. The Contact Center is open Monday through Friday 7AM to 5PM and may speak to a nurse at [phone], or toll free at [phone].     A registered nurse may call you a few days after your surgery to see how you are doing after your procedure.    MEDICATIONS: Resume taking daily medication.  Take prescribed pain medication with food.  If no medication is prescribed, you may take non-aspirin pain medication if needed.  PAIN MEDICATION CAN BE VERY CONSTIPATING.  Take a stool softener or laxative such as senokot, pericolace, or milk of magnesia if needed.    Prescription given for Antibiotic eye ointment.  Last pain medication given at ______________.        Depression / Suicide Risk    As you are discharged from this [facility] facility, it is important to learn how to keep safe from harming yourself.    Recognize the warning signs:  · Abrupt changes in personality, positive or negative- including increase in  energy   · Giving away possessions  · Change in eating patterns- significant weight changes-  positive or negative  · Change in sleeping patterns- unable to sleep or sleeping all the time   · Unwillingness or inability to communicate  · Depression  · Unusual sadness, discouragement and loneliness  · Talk of wanting to die  · Neglect of personal appearance   · Rebelliousness- reckless behavior  · Withdrawal from people/activities they love  · Confusion- inability to concentrate     If you or a loved one observes any of these behaviors or has concerns about self-harm, here's what you can do:  · Talk about it- your feelings and reasons for harming yourself  · Remove any means that you might use to hurt yourself (examples: pills, rope, extension cords, firearm)  · Get professional help from the community (Mental Health, Substance Abuse, psychological counseling)  · Do not be alone:Call your Safe Contact- someone whom you trust who will be there for you.  · Call your local CRISIS HOTLINE 989-5612 or 471-061-8111  · Call your local Children's Mobile Crisis Response Team [place] (505) 627-7128 or www.[employer]  · Call the toll free National Suicide Prevention Hotlines   · National Suicide Prevention Lifeline 569-944-YOJN (4113)  · National Hope Line Network 800-SUICIDE (238-7785)

## 2021-08-19 NOTE — ANESTHESIA TIME REPORT
Anesthesia Start and Stop Event Times     Date Time Event    8/18/2021 1529 Ready for Procedure     1547 Anesthesia Start     1702 Anesthesia Stop        Responsible Staff  08/18/21    Name Role Begin End    Skinny Ledesma D.O. Anesth 1547 1702        Preop Diagnosis (Free Text):  Pre-op Diagnosis     VISUAL FIELD OBSTRUCTION, DERMATOCHALASIS        Preop Diagnosis (Codes):    Post op Diagnosis  Dermatochalasis of both eyelids      Premium Reason  Non-Premium    Comments:

## (undated) DEVICE — SODIUM CHL IRRIGATION 0.9% 1000ML (12EA/CA)

## (undated) DEVICE — WIPE INSTRUMENT MICROWIPE (20EA/SP)

## (undated) DEVICE — PENCIL ELECTSURG 10FT BTN SWH - (50/CA)

## (undated) DEVICE — CANISTER SUCTION 3000ML MECHANICAL FILTER AUTO SHUTOFF MEDI-VAC NONSTERILE LF DISP  (40EA/CA)

## (undated) DEVICE — LACTATED RINGERS INJ 1000 ML - (14EA/CA 60CA/PF)

## (undated) DEVICE — KIT  I.V. START (100EA/CA)

## (undated) DEVICE — SUCTION INSTRUMENT YANKAUER BULBOUS TIP W/O VENT (50EA/CA)

## (undated) DEVICE — WATER IRRIGATION STERILE 1000ML (12EA/CA)

## (undated) DEVICE — KIT ANESTHESIA W/CIRCUIT & 3/LT BAG W/FILTER (20EA/CA)

## (undated) DEVICE — SLEEVE VASO CALF MED - (10PR/CA)

## (undated) DEVICE — PEN SKIN MARKER W/RULER - (50EA/BX)

## (undated) DEVICE — GOWN WARMING STANDARD FLEX - (30/CA)

## (undated) DEVICE — SUTURE GENERAL

## (undated) DEVICE — TUBE CONNECTING SUCTION - CLEAR PLASTIC STERILE 72 IN (50EA/CA)

## (undated) DEVICE — SUTURE 6-0 PLAIN GUT G-1 D/A 18 (12PK/BX)"

## (undated) DEVICE — ELECTRODE 850 FOAM ADHESIVE - HYDROGEL RADIOTRNSPRNT (50/PK)

## (undated) DEVICE — GLOVE BIOGEL PI INDICATOR SZ 7.0 SURGICAL PF LF - (50/BX 4BX/CA)

## (undated) DEVICE — SET LEADWIRE 5 LEAD BEDSIDE DISPOSABLE ECG (1SET OF 5/EA)

## (undated) DEVICE — SYRINGE NON SAFETY 3 CC 21 GA X 1 1/2 IN (100/BX 8BX/CA)

## (undated) DEVICE — SYRINGE 30 ML LL (56/BX)

## (undated) DEVICE — GLOVES, #7 1/2 BIOGEL M

## (undated) DEVICE — CATHETER IV 20 GA X 1-1/4 ---SURG.& SDS ONLY--- (50EA/BX)

## (undated) DEVICE — SUTURE 5-0 PLAIN GUT PC-1 - (12/BX)

## (undated) DEVICE — MASK AIRWAY SIZE 4 UNIQUE SILICON (10EA/BX)

## (undated) DEVICE — ELECTRODE DUAL RETURN W/ CORD - (50/PK)

## (undated) DEVICE — SENSOR SPO2 NEO LNCS ADHESIVE (20/BX) SEE USER NOTES

## (undated) DEVICE — TOWEL STOP TIMEOUT SAFETY FLAG (40EA/CA)

## (undated) DEVICE — CANISTER SUCTION RIGID RED 1500CC (40EA/CA)

## (undated) DEVICE — SHIELD OPTH AL GRTR CVR FOX (50EA/BX)

## (undated) DEVICE — TUBING CLEARLINK DUO-VENT - C-FLO (48EA/CA)